# Patient Record
Sex: FEMALE | Race: ASIAN | NOT HISPANIC OR LATINO
[De-identification: names, ages, dates, MRNs, and addresses within clinical notes are randomized per-mention and may not be internally consistent; named-entity substitution may affect disease eponyms.]

---

## 2018-06-14 ENCOUNTER — APPOINTMENT (OUTPATIENT)
Dept: INTERNAL MEDICINE | Facility: CLINIC | Age: 33
End: 2018-06-14
Payer: COMMERCIAL

## 2018-06-14 VITALS — DIASTOLIC BLOOD PRESSURE: 56 MMHG | SYSTOLIC BLOOD PRESSURE: 96 MMHG

## 2018-06-14 VITALS — HEIGHT: 64 IN | WEIGHT: 173 LBS | BODY MASS INDEX: 29.53 KG/M2

## 2018-06-14 VITALS — HEART RATE: 74 BPM | DIASTOLIC BLOOD PRESSURE: 56 MMHG | RESPIRATION RATE: 12 BRPM | SYSTOLIC BLOOD PRESSURE: 96 MMHG

## 2018-06-14 DIAGNOSIS — Z87.891 PERSONAL HISTORY OF NICOTINE DEPENDENCE: ICD-10-CM

## 2018-06-14 DIAGNOSIS — R53.83 OTHER MALAISE: ICD-10-CM

## 2018-06-14 DIAGNOSIS — R53.81 OTHER MALAISE: ICD-10-CM

## 2018-06-14 DIAGNOSIS — F17.290 NICOTINE DEPENDENCE, OTHER TOBACCO PRODUCT, UNCOMPLICATED: ICD-10-CM

## 2018-06-14 DIAGNOSIS — Z00.00 ENCOUNTER FOR GENERAL ADULT MEDICAL EXAMINATION W/OUT ABNORMAL FINDINGS: ICD-10-CM

## 2018-06-14 DIAGNOSIS — Z83.42 FAMILY HISTORY OF FAMILIAL HYPERCHOLESTEROLEMIA: ICD-10-CM

## 2018-06-14 DIAGNOSIS — Z30.9 ENCOUNTER FOR CONTRACEPTIVE MANAGEMENT, UNSPECIFIED: ICD-10-CM

## 2018-06-14 DIAGNOSIS — Z82.49 FAMILY HISTORY OF ISCHEMIC HEART DISEASE AND OTHER DISEASES OF THE CIRCULATORY SYSTEM: ICD-10-CM

## 2018-06-14 DIAGNOSIS — Z78.9 OTHER SPECIFIED HEALTH STATUS: ICD-10-CM

## 2018-06-14 PROCEDURE — 99385 PREV VISIT NEW AGE 18-39: CPT | Mod: 25

## 2018-06-14 PROCEDURE — 36415 COLL VENOUS BLD VENIPUNCTURE: CPT

## 2018-06-14 RX ORDER — PRENATAL 56/IRON/FOLIC AC/DHA 35-5-1 MG
35-5-1-200 CAPSULE ORAL
Qty: 30 | Refills: 0 | Status: ACTIVE | COMMUNITY
Start: 2018-06-05

## 2018-06-14 RX ORDER — NORGESTIMATE AND ETHINYL ESTRADIOL 0.25-0.035
0.25-35 KIT ORAL
Qty: 28 | Refills: 0 | Status: COMPLETED | COMMUNITY
Start: 2018-04-07

## 2018-06-14 NOTE — END OF VISIT
[Time Spent: ___ minutes] : I have spent [unfilled] minutes of face to face time with the patient [>50% of Time Spent on Counseling for ____] : Greater than 50% of the encounter time was spent on counseling for [unfilled]

## 2018-06-14 NOTE — PAST MEDICAL HISTORY
[Definite ___ (Date)] : the last menstrual period was [unfilled] [Normal Amount/Duration] : it was of a normal amount and duration [Regular Cycle Intervals] : have been regular

## 2018-06-15 LAB
25(OH)D3 SERPL-MCNC: 26.4 NG/ML
ALBUMIN SERPL ELPH-MCNC: 4.4 G/DL
ALP BLD-CCNC: 61 U/L
ALT SERPL-CCNC: 25 U/L
ANION GAP SERPL CALC-SCNC: 10 MMOL/L
AST SERPL-CCNC: 18 U/L
BASOPHILS # BLD AUTO: 0.03 K/UL
BASOPHILS NFR BLD AUTO: 0.3 %
BILIRUB SERPL-MCNC: <0.2 MG/DL
BUN SERPL-MCNC: 14 MG/DL
CALCIUM SERPL-MCNC: 9.7 MG/DL
CHLORIDE SERPL-SCNC: 101 MMOL/L
CHOLEST SERPL-MCNC: 233 MG/DL
CHOLEST/HDLC SERPL: 3.9 RATIO
CO2 SERPL-SCNC: 30 MMOL/L
CREAT SERPL-MCNC: 0.92 MG/DL
EOSINOPHIL # BLD AUTO: 0.18 K/UL
EOSINOPHIL NFR BLD AUTO: 1.6 %
GLUCOSE SERPL-MCNC: 82 MG/DL
HBA1C MFR BLD HPLC: 5.3 %
HCT VFR BLD CALC: 45.5 %
HDLC SERPL-MCNC: 60 MG/DL
HGB BLD-MCNC: 14.2 G/DL
IMM GRANULOCYTES NFR BLD AUTO: 0.3 %
LDLC SERPL CALC-MCNC: 148 MG/DL
LYMPHOCYTES # BLD AUTO: 3.72 K/UL
LYMPHOCYTES NFR BLD AUTO: 33.8 %
MAN DIFF?: NORMAL
MCHC RBC-ENTMCNC: 29.2 PG
MCHC RBC-ENTMCNC: 31.2 GM/DL
MCV RBC AUTO: 93.4 FL
MONOCYTES # BLD AUTO: 0.78 K/UL
MONOCYTES NFR BLD AUTO: 7.1 %
NEUTROPHILS # BLD AUTO: 6.26 K/UL
NEUTROPHILS NFR BLD AUTO: 56.9 %
PLATELET # BLD AUTO: 351 K/UL
POTASSIUM SERPL-SCNC: 4.3 MMOL/L
PROT SERPL-MCNC: 7.4 G/DL
RBC # BLD: 4.87 M/UL
RBC # FLD: 13.4 %
SODIUM SERPL-SCNC: 141 MMOL/L
TRIGL SERPL-MCNC: 127 MG/DL
TSH SERPL-ACNC: 1.91 UIU/ML
WBC # FLD AUTO: 11 K/UL

## 2019-01-14 ENCOUNTER — APPOINTMENT (OUTPATIENT)
Dept: ANTEPARTUM | Facility: CLINIC | Age: 34
End: 2019-01-14
Payer: COMMERCIAL

## 2019-01-14 ENCOUNTER — ASOB RESULT (OUTPATIENT)
Age: 34
End: 2019-01-14

## 2019-01-14 PROCEDURE — 76811 OB US DETAILED SNGL FETUS: CPT

## 2019-01-22 ENCOUNTER — APPOINTMENT (OUTPATIENT)
Dept: ANTEPARTUM | Facility: CLINIC | Age: 34
End: 2019-01-22
Payer: COMMERCIAL

## 2019-01-22 ENCOUNTER — ASOB RESULT (OUTPATIENT)
Age: 34
End: 2019-01-22

## 2019-01-22 PROCEDURE — 76816 OB US FOLLOW-UP PER FETUS: CPT

## 2019-01-28 ENCOUNTER — TRANSCRIPTION ENCOUNTER (OUTPATIENT)
Age: 34
End: 2019-01-28

## 2019-04-18 ENCOUNTER — TRANSCRIPTION ENCOUNTER (OUTPATIENT)
Age: 34
End: 2019-04-18

## 2019-05-30 ENCOUNTER — TRANSCRIPTION ENCOUNTER (OUTPATIENT)
Age: 34
End: 2019-05-30

## 2019-05-30 ENCOUNTER — INPATIENT (INPATIENT)
Facility: HOSPITAL | Age: 34
LOS: 5 days | Discharge: HOME | End: 2019-06-05
Attending: OBSTETRICS & GYNECOLOGY | Admitting: OBSTETRICS & GYNECOLOGY
Payer: COMMERCIAL

## 2019-05-30 DIAGNOSIS — O26.899 OTHER SPECIFIED PREGNANCY RELATED CONDITIONS, UNSPECIFIED TRIMESTER: ICD-10-CM

## 2019-05-30 DIAGNOSIS — Z34.80 ENCOUNTER FOR SUPERVISION OF OTHER NORMAL PREGNANCY, UNSPECIFIED TRIMESTER: ICD-10-CM

## 2019-05-30 DIAGNOSIS — Z3A.00 WEEKS OF GESTATION OF PREGNANCY NOT SPECIFIED: ICD-10-CM

## 2019-05-30 RX ORDER — SODIUM CHLORIDE 9 MG/ML
1000 INJECTION, SOLUTION INTRAVENOUS
Refills: 0 | Status: DISCONTINUED | OUTPATIENT
Start: 2019-05-30 | End: 2019-05-31

## 2019-05-30 RX ORDER — AMPICILLIN TRIHYDRATE 250 MG
2 CAPSULE ORAL ONCE
Refills: 0 | Status: COMPLETED | OUTPATIENT
Start: 2019-05-30 | End: 2019-05-30

## 2019-05-30 RX ORDER — AMPICILLIN TRIHYDRATE 250 MG
1 CAPSULE ORAL EVERY 4 HOURS
Refills: 0 | Status: DISCONTINUED | OUTPATIENT
Start: 2019-05-30 | End: 2019-05-31

## 2019-05-30 RX ORDER — OXYTOCIN 10 UNIT/ML
333.33 VIAL (ML) INJECTION
Qty: 20 | Refills: 0 | Status: DISCONTINUED | OUTPATIENT
Start: 2019-05-30 | End: 2019-06-05

## 2019-05-30 RX ORDER — CITRIC ACID/SODIUM CITRATE 300-500 MG
15 SOLUTION, ORAL ORAL EVERY 6 HOURS
Refills: 0 | Status: DISCONTINUED | OUTPATIENT
Start: 2019-05-30 | End: 2019-05-31

## 2019-05-31 VITALS
DIASTOLIC BLOOD PRESSURE: 66 MMHG | RESPIRATION RATE: 23 BRPM | HEIGHT: 64 IN | HEART RATE: 86 BPM | SYSTOLIC BLOOD PRESSURE: 131 MMHG | WEIGHT: 210.1 LBS

## 2019-05-31 LAB
BASOPHILS # BLD AUTO: 0.1 K/UL — SIGNIFICANT CHANGE UP (ref 0–0.2)
BASOPHILS NFR BLD AUTO: 0.5 % — SIGNIFICANT CHANGE UP (ref 0–2)
BLD GP AB SCN SERPL QL: NEGATIVE — SIGNIFICANT CHANGE UP
EOSINOPHIL # BLD AUTO: 0.1 K/UL — SIGNIFICANT CHANGE UP (ref 0–0.5)
EOSINOPHIL NFR BLD AUTO: 0.6 % — SIGNIFICANT CHANGE UP (ref 0–6)
HCT VFR BLD CALC: 40.3 % — SIGNIFICANT CHANGE UP (ref 34.5–45)
HGB BLD-MCNC: 13.6 G/DL — SIGNIFICANT CHANGE UP (ref 11.5–15.5)
LYMPHOCYTES # BLD AUTO: 19.8 % — SIGNIFICANT CHANGE UP (ref 13–44)
LYMPHOCYTES # BLD AUTO: 2.5 K/UL — SIGNIFICANT CHANGE UP (ref 1–3.3)
MCHC RBC-ENTMCNC: 31 PG — SIGNIFICANT CHANGE UP (ref 27–34)
MCHC RBC-ENTMCNC: 33.7 GM/DL — SIGNIFICANT CHANGE UP (ref 32–36)
MCV RBC AUTO: 92 FL — SIGNIFICANT CHANGE UP (ref 80–100)
MONOCYTES # BLD AUTO: 1 K/UL — HIGH (ref 0–0.9)
MONOCYTES NFR BLD AUTO: 7.5 % — SIGNIFICANT CHANGE UP (ref 2–14)
NEUTROPHILS # BLD AUTO: 9.1 K/UL — HIGH (ref 1.8–7.4)
NEUTROPHILS NFR BLD AUTO: 71.5 % — SIGNIFICANT CHANGE UP (ref 43–77)
PLATELET # BLD AUTO: 275 K/UL — SIGNIFICANT CHANGE UP (ref 150–400)
RBC # BLD: 4.38 M/UL — SIGNIFICANT CHANGE UP (ref 3.8–5.2)
RBC # FLD: 13 % — SIGNIFICANT CHANGE UP (ref 10.3–14.5)
RH IG SCN BLD-IMP: NEGATIVE — SIGNIFICANT CHANGE UP
RH IG SCN BLD-IMP: NEGATIVE — SIGNIFICANT CHANGE UP
T PALLIDUM AB TITR SER: NEGATIVE — SIGNIFICANT CHANGE UP
WBC # BLD: 12.8 K/UL — HIGH (ref 3.8–10.5)
WBC # FLD AUTO: 12.8 K/UL — HIGH (ref 3.8–10.5)

## 2019-05-31 RX ORDER — OXYCODONE HYDROCHLORIDE 5 MG/1
5 TABLET ORAL
Refills: 0 | Status: DISCONTINUED | OUTPATIENT
Start: 2019-05-31 | End: 2019-06-05

## 2019-05-31 RX ORDER — SIMETHICONE 80 MG/1
80 TABLET, CHEWABLE ORAL EVERY 4 HOURS
Refills: 0 | Status: DISCONTINUED | OUTPATIENT
Start: 2019-05-31 | End: 2019-06-05

## 2019-05-31 RX ORDER — DOCUSATE SODIUM 100 MG
100 CAPSULE ORAL
Refills: 0 | Status: DISCONTINUED | OUTPATIENT
Start: 2019-05-31 | End: 2019-06-05

## 2019-05-31 RX ORDER — GLYCERIN ADULT
1 SUPPOSITORY, RECTAL RECTAL AT BEDTIME
Refills: 0 | Status: DISCONTINUED | OUTPATIENT
Start: 2019-05-31 | End: 2019-06-05

## 2019-05-31 RX ORDER — PRAMOXINE HYDROCHLORIDE 150 MG/15G
1 AEROSOL, FOAM RECTAL EVERY 4 HOURS
Refills: 0 | Status: DISCONTINUED | OUTPATIENT
Start: 2019-05-31 | End: 2019-06-05

## 2019-05-31 RX ORDER — LANOLIN
1 OINTMENT (GRAM) TOPICAL EVERY 6 HOURS
Refills: 0 | Status: DISCONTINUED | OUTPATIENT
Start: 2019-05-31 | End: 2019-06-05

## 2019-05-31 RX ORDER — DIBUCAINE 1 %
1 OINTMENT (GRAM) RECTAL EVERY 6 HOURS
Refills: 0 | Status: DISCONTINUED | OUTPATIENT
Start: 2019-05-31 | End: 2019-06-05

## 2019-05-31 RX ORDER — TETANUS TOXOID, REDUCED DIPHTHERIA TOXOID AND ACELLULAR PERTUSSIS VACCINE, ADSORBED 5; 2.5; 8; 8; 2.5 [IU]/.5ML; [IU]/.5ML; UG/.5ML; UG/.5ML; UG/.5ML
0.5 SUSPENSION INTRAMUSCULAR ONCE
Refills: 0 | Status: DISCONTINUED | OUTPATIENT
Start: 2019-05-31 | End: 2019-06-05

## 2019-05-31 RX ORDER — MAGNESIUM HYDROXIDE 400 MG/1
30 TABLET, CHEWABLE ORAL
Refills: 0 | Status: DISCONTINUED | OUTPATIENT
Start: 2019-05-31 | End: 2019-06-05

## 2019-05-31 RX ORDER — OXYCODONE HYDROCHLORIDE 5 MG/1
5 TABLET ORAL ONCE
Refills: 0 | Status: DISCONTINUED | OUTPATIENT
Start: 2019-05-31 | End: 2019-06-05

## 2019-05-31 RX ORDER — IBUPROFEN 200 MG
600 TABLET ORAL EVERY 6 HOURS
Refills: 0 | Status: COMPLETED | OUTPATIENT
Start: 2019-05-31 | End: 2020-04-28

## 2019-05-31 RX ORDER — GENTAMICIN SULFATE 40 MG/ML
475 VIAL (ML) INJECTION ONCE
Refills: 0 | Status: COMPLETED | OUTPATIENT
Start: 2019-05-31 | End: 2019-05-31

## 2019-05-31 RX ORDER — KETOROLAC TROMETHAMINE 30 MG/ML
30 SYRINGE (ML) INJECTION ONCE
Refills: 0 | Status: DISCONTINUED | OUTPATIENT
Start: 2019-05-31 | End: 2019-05-31

## 2019-05-31 RX ORDER — AER TRAVELER 0.5 G/1
1 SOLUTION RECTAL; TOPICAL EVERY 4 HOURS
Refills: 0 | Status: DISCONTINUED | OUTPATIENT
Start: 2019-05-31 | End: 2019-06-05

## 2019-05-31 RX ORDER — IBUPROFEN 200 MG
600 TABLET ORAL EVERY 6 HOURS
Refills: 0 | Status: DISCONTINUED | OUTPATIENT
Start: 2019-05-31 | End: 2019-06-05

## 2019-05-31 RX ORDER — ACETAMINOPHEN 500 MG
975 TABLET ORAL EVERY 6 HOURS
Refills: 0 | Status: DISCONTINUED | OUTPATIENT
Start: 2019-05-31 | End: 2019-06-05

## 2019-05-31 RX ORDER — OXYTOCIN 10 UNIT/ML
4 VIAL (ML) INJECTION
Qty: 30 | Refills: 0 | Status: DISCONTINUED | OUTPATIENT
Start: 2019-05-31 | End: 2019-06-05

## 2019-05-31 RX ORDER — BENZOCAINE 10 %
1 GEL (GRAM) MUCOUS MEMBRANE EVERY 6 HOURS
Refills: 0 | Status: DISCONTINUED | OUTPATIENT
Start: 2019-05-31 | End: 2019-06-05

## 2019-05-31 RX ORDER — OXYTOCIN 10 UNIT/ML
333.33 VIAL (ML) INJECTION
Qty: 20 | Refills: 0 | Status: DISCONTINUED | OUTPATIENT
Start: 2019-05-31 | End: 2019-06-05

## 2019-05-31 RX ORDER — SODIUM CHLORIDE 9 MG/ML
3 INJECTION INTRAMUSCULAR; INTRAVENOUS; SUBCUTANEOUS EVERY 8 HOURS
Refills: 0 | Status: DISCONTINUED | OUTPATIENT
Start: 2019-05-31 | End: 2019-06-05

## 2019-05-31 RX ORDER — HYDROCORTISONE 1 %
1 OINTMENT (GRAM) TOPICAL EVERY 6 HOURS
Refills: 0 | Status: DISCONTINUED | OUTPATIENT
Start: 2019-05-31 | End: 2019-06-05

## 2019-05-31 RX ORDER — DIPHENHYDRAMINE HCL 50 MG
25 CAPSULE ORAL EVERY 6 HOURS
Refills: 0 | Status: DISCONTINUED | OUTPATIENT
Start: 2019-05-31 | End: 2019-06-05

## 2019-05-31 RX ADMIN — Medication 300 MILLIGRAM(S): at 12:45

## 2019-05-31 RX ADMIN — Medication 216 GRAM(S): at 00:47

## 2019-05-31 RX ADMIN — Medication 975 MILLIGRAM(S): at 22:56

## 2019-05-31 RX ADMIN — Medication 108 GRAM(S): at 04:48

## 2019-05-31 RX ADMIN — Medication 30 MILLIGRAM(S): at 14:56

## 2019-05-31 RX ADMIN — Medication 975 MILLIGRAM(S): at 11:30

## 2019-05-31 RX ADMIN — Medication 108 GRAM(S): at 09:15

## 2019-05-31 RX ADMIN — SODIUM CHLORIDE 125 MILLILITER(S): 9 INJECTION, SOLUTION INTRAVENOUS at 00:47

## 2019-05-31 RX ADMIN — Medication 4 MILLIUNIT(S)/MIN: at 04:48

## 2019-06-01 LAB
HCT VFR BLD CALC: 29.9 % — LOW (ref 34.5–45)
HCT VFR BLD CALC: 29.9 % — LOW (ref 34.5–45)
HGB BLD-MCNC: 9.6 G/DL — LOW (ref 11.5–15.5)
HGB BLD-MCNC: 9.6 G/DL — LOW (ref 11.5–15.5)
KLEIHAUER-BETKE CALCULATION: 0 % — SIGNIFICANT CHANGE UP (ref 0–0.3)
MCHC RBC-ENTMCNC: 30.4 PG — SIGNIFICANT CHANGE UP (ref 27–34)
MCHC RBC-ENTMCNC: 32.1 GM/DL — SIGNIFICANT CHANGE UP (ref 32–36)
MCV RBC AUTO: 94.6 FL — SIGNIFICANT CHANGE UP (ref 80–100)
PLATELET # BLD AUTO: 218 K/UL — SIGNIFICANT CHANGE UP (ref 150–400)
RBC # BLD: 3.16 M/UL — LOW (ref 3.8–5.2)
RBC # FLD: 14.3 % — SIGNIFICANT CHANGE UP (ref 10.3–14.5)
WBC # BLD: 16.64 K/UL — HIGH (ref 3.8–10.5)
WBC # FLD AUTO: 16.64 K/UL — HIGH (ref 3.8–10.5)

## 2019-06-01 RX ADMIN — Medication 600 MILLIGRAM(S): at 08:02

## 2019-06-01 RX ADMIN — Medication 600 MILLIGRAM(S): at 15:30

## 2019-06-01 RX ADMIN — Medication 600 MILLIGRAM(S): at 02:50

## 2019-06-01 RX ADMIN — Medication 600 MILLIGRAM(S): at 15:38

## 2019-06-01 RX ADMIN — Medication 975 MILLIGRAM(S): at 17:54

## 2019-06-01 RX ADMIN — MAGNESIUM HYDROXIDE 30 MILLILITER(S): 400 TABLET, CHEWABLE ORAL at 22:44

## 2019-06-01 RX ADMIN — Medication 975 MILLIGRAM(S): at 08:25

## 2019-06-01 RX ADMIN — Medication 975 MILLIGRAM(S): at 08:01

## 2019-06-01 NOTE — PROGRESS NOTE ADULT - SUBJECTIVE AND OBJECTIVE BOX
OB Progress Note: VAVD PPD#1    S: 32yo  PPD#1 s/p VAVD c/b intrapartum temperature. Patient feels well. Pain is well controlled. She is tolerating a regular diet and passing flatus. She is voiding spontaneously, and ambulating without difficulty. Denies CP/SOB. Denies lightheadedness/dizziness. Denies N/V.    O:  Vitals:  Vital Signs Last 24 Hrs  T(C): 37.2 (31 May 2019 14:05), Max: 37.2 (31 May 2019 14:05)  T(F): 99 (31 May 2019 14:05), Max: 99 (31 May 2019 14:05)  HR: 88 (01 Jun 2019 06:30) (82 - 116)  BP: 128/63 (01 Jun 2019 06:30) (100/47 - 128/63)  BP(mean): 81 (01 Jun 2019 01:45) (73 - 88)  RR: 18 (31 May 2019 18:45) (17 - 18)  SpO2: 98% (01 Jun 2019 06:30) (95% - 98%)    MEDICATIONS  (STANDING):  acetaminophen   Tablet .. 975 milliGRAM(s) Oral every 6 hours  diphtheria/tetanus/pertussis (acellular) Vaccine (ADAcel) 0.5 milliLiter(s) IntraMuscular once  ibuprofen  Tablet. 600 milliGRAM(s) Oral every 6 hours  oxytocin Infusion 333.333 milliUNIT(s)/Min (1000 mL/Hr) IV Continuous <Continuous>  oxytocin Infusion 333.333 milliUNIT(s)/Min (1000 mL/Hr) IV Continuous <Continuous>  oxytocin Infusion 4 milliUNIT(s)/Min (4 mL/Hr) IV Continuous <Continuous>  prenatal multivitamin 1 Tablet(s) Oral daily  sodium chloride 0.9% lock flush 3 milliLiter(s) IV Push every 8 hours      Physical Exam:  General: NAD  Abdomen: soft, non-tender, non-distended, fundus firm  Vaginal: Lochia wnl  Extremities: NTTP, no erythema, no edema    Labs:  Blood type: A Negative  Rubella IgG: RPR: Negative                          13.6   12.8<H> >-----------< 275    ( 05-31 @ 00:14 )             40.3 OB Progress Note: VAVD PPD#1    S: 34yo  PPD#1 s/p VAVD c/b intrapartum temperature. Patient feels well. Pain is well controlled. However difficulty walking overnight. Able to stand however cannot weight bear on a single leg, and therefore cannnot ambulate. She is tolerating a regular diet and passing flatus. She is voiding spontaneously. Denies CP/SOB. Denies lightheadedness/dizziness. Denies N/V.    O:  Vitals:  Vital Signs Last 24 Hrs  T(C): 37.2 (31 May 2019 14:05), Max: 37.2 (31 May 2019 14:05)  T(F): 99 (31 May 2019 14:05), Max: 99 (31 May 2019 14:05)  HR: 88 (01 Jun 2019 06:30) (82 - 116)  BP: 128/63 (01 Jun 2019 06:30) (100/47 - 128/63)  BP(mean): 81 (01 Jun 2019 01:45) (73 - 88)  RR: 18 (31 May 2019 18:45) (17 - 18)  SpO2: 98% (01 Jun 2019 06:30) (95% - 98%)    MEDICATIONS  (STANDING):  acetaminophen   Tablet .. 975 milliGRAM(s) Oral every 6 hours  diphtheria/tetanus/pertussis (acellular) Vaccine (ADAcel) 0.5 milliLiter(s) IntraMuscular once  ibuprofen  Tablet. 600 milliGRAM(s) Oral every 6 hours  oxytocin Infusion 333.333 milliUNIT(s)/Min (1000 mL/Hr) IV Continuous <Continuous>  oxytocin Infusion 333.333 milliUNIT(s)/Min (1000 mL/Hr) IV Continuous <Continuous>  oxytocin Infusion 4 milliUNIT(s)/Min (4 mL/Hr) IV Continuous <Continuous>  prenatal multivitamin 1 Tablet(s) Oral daily  sodium chloride 0.9% lock flush 3 milliLiter(s) IV Push every 8 hours      Physical Exam:  General: NAD  Abdomen: soft, non-tender, non-distended, fundus firm  Vaginal: Lochia wnl  Lower Extremities: NTTP, no erythema, no edema. Strength 5/5 bilaterally. ROM intact. Decreased gross sensation over knee only.     Labs:  Blood type: A Negative  Rubella IgG: RPR: Negative                          13.6   12.8<H> >-----------< 275    ( 05-31 @ 00:14 )             40.3

## 2019-06-01 NOTE — PROGRESS NOTE ADULT - PROBLEM SELECTOR PLAN 1
-Patient afebrile overnight. VSS and wnl. Is s/p abx. Will continue to monitor.  - Pain well controlled, continue current pain regimen  - Increase ambulation  - Continue regular diet    Narcisa Ziegler PGY-1 -Patient afebrile overnight. VSS and wnl. Is s/p abx. Will continue to monitor.  - LE strength intact bilaterally and given that paresthesia does not follow a distinct nerve distribution, possibly residual local anesthesia versus nerve impingement/involvement. Per anesthesia agree with aforementioned. Recommend neuro consult.  - Pain well controlled, continue current pain regimen  - Continue regular diet    Narcisa Ziegler PGY-1

## 2019-06-01 NOTE — CONSULT NOTE ADULT - ASSESSMENT
33y Female PD#1 s/p VAVD, neurology consulted for BLE knee numbness. PE with no signs of CNS involvement; numbness likely 2/2 residual anesthesia; less likely but possibly 2/2 trauma from epidural although findings not fully consistent with dermatomal distribution.     Recs:  No acute neurologic intervention at this time.

## 2019-06-01 NOTE — PROVIDER CONTACT NOTE (OTHER) - SITUATION
patient is s/p  at 1323 on ; patient unable to lift left leg, anesthesia assessed on  at 2200 as per patient; patient states numbness, denies pain, vital signs WNL

## 2019-06-01 NOTE — CONSULT NOTE ADULT - SUBJECTIVE AND OBJECTIVE BOX
Neurology Consult    33y Female PD#1 s/p VAVD, neurology consulted for BLE knee numbness. As per patient, two hours post procedure patient had unsteady gait. Patient also reporting BLE knee numbness which started from onset of epidural, has progressively improved with residual numbness in knees, L > R    REVIEW OF SYSTEMS:  As abvoe    MEDICATIONS  acetaminophen   Tablet .. 975 milliGRAM(s) Oral every 6 hours  benzocaine 20%/menthol 0.5% Spray 1 Spray(s) Topical every 6 hours PRN  dibucaine 1% Ointment 1 Application(s) Topical every 6 hours PRN  diphenhydrAMINE 25 milliGRAM(s) Oral every 6 hours PRN  diphtheria/tetanus/pertussis (acellular) Vaccine (ADAcel) 0.5 milliLiter(s) IntraMuscular once  docusate sodium 100 milliGRAM(s) Oral two times a day PRN  glycerin Suppository - Adult 1 Suppository(s) Rectal at bedtime PRN  hydrocortisone 1% Cream 1 Application(s) Topical every 6 hours PRN  ibuprofen  Tablet. 600 milliGRAM(s) Oral every 6 hours  lanolin Ointment 1 Application(s) Topical every 6 hours PRN  magnesium hydroxide Suspension 30 milliLiter(s) Oral two times a day PRN  oxyCODONE    IR 5 milliGRAM(s) Oral every 3 hours PRN  oxyCODONE    IR 5 milliGRAM(s) Oral once PRN  oxytocin Infusion 333.333 milliUNIT(s)/Min IV Continuous <Continuous>  oxytocin Infusion 333.333 milliUNIT(s)/Min IV Continuous <Continuous>  oxytocin Infusion 4 milliUNIT(s)/Min IV Continuous <Continuous>  pramoxine 1%/zinc 5% Cream 1 Application(s) Topical every 4 hours PRN  prenatal multivitamin 1 Tablet(s) Oral daily  simethicone 80 milliGRAM(s) Chew every 4 hours PRN  sodium chloride 0.9% lock flush 3 milliLiter(s) IV Push every 8 hours  witch hazel Pads 1 Application(s) Topical every 4 hours PRN      PMH:      PSH:     FAMILY HISTORY:    No history of dementia, strokes, or seizures     SOCIAL HISTORY:  No history of tobacco or alcohol use     Allergies    No Known Allergies    Intolerances        Vital Signs Last 24 Hrs  T(C): 36.5 (01 Jun 2019 07:40), Max: 37.2 (31 May 2019 14:05)  T(F): 97.7 (01 Jun 2019 07:40), Max: 99 (31 May 2019 14:05)  HR: 82 (01 Jun 2019 07:40) (82 - 116)  BP: 137/60 (01 Jun 2019 07:40) (100/47 - 137/60)  BP(mean): 87 (01 Jun 2019 07:40) (73 - 88)  RR: 17 (01 Jun 2019 07:40) (17 - 18)  SpO2: 96% (01 Jun 2019 07:40) (95% - 98%)    Neurological Examination:    Mental Status: Patient is alert, awake, oriented X3. Patient is fluent, no dysarthria, no aphasia. Follows commands well and able to answer questions appropriately. Mood and affect normal.    Cranial Nerves:  EOMI, visual field intact, V1-V3 intact, no gross facial asymmetry, tongue/uvula midline    Motor Exam: No drift  Right upper extremity: 5/5  Left upper extremity: 5/5  Right lower extremity: 3/5 hip flex(pain limited), 5/5 otherwise  Left lower extremity: 2/5 hip flex(pain limited), 5/5 otherwise    Normal bulk/tone    Sensory: decreased to LT, pinprick; intact to proprioception, senses vibration > 10 secs in duration BLE knee, L anterior shin and dorsum of foot. Otherwise intact.     Coordination: Finger to nose intact bilaterally     Gait: Deferred    Reflexes: Bilateral 2+ Biceps, Brachial, +1 Patellar, +2 Ankle  Plantar: Down bilateral    GENERAL: No acute distress  HEENT:  Normocephalic, atraumatic  EXTREMITIES: No edema, clubbing, cyanosis  MUSCULOSKELETAL: Normal range of motion  SKIN: No rashes    LABS:  CBC Full  -  ( 31 May 2019 00:14 )  WBC Count : 12.8 K/uL  RBC Count : 4.38 M/uL  Hemoglobin : 13.6 g/dL  Hematocrit : 40.3 %  Platelet Count - Automated : 275 K/uL  Mean Cell Volume : 92.0 fl  Mean Cell Hemoglobin : 31.0 pg  Mean Cell Hemoglobin Concentration : 33.7 gm/dL  Auto Neutrophil # : 9.1 K/uL  Auto Lymphocyte # : 2.5 K/uL  Auto Monocyte # : 1.0 K/uL  Auto Eosinophil # : 0.1 K/uL  Auto Basophil # : 0.1 K/uL  Auto Neutrophil % : 71.5 %  Auto Lymphocyte % : 19.8 %  Auto Monocyte % : 7.5 %  Auto Eosinophil % : 0.6 %  Auto Basophil % : 0.5 %              Hemoglobin A1C:           RADIOLOGY:  CT head:  MRI:

## 2019-06-01 NOTE — CONSULT NOTE ADULT - ATTENDING COMMENTS
Patient PD d 2 s/p epidural for VAVD who has experienced weakness and numbness in her legs with buckling of her legs when attempting to walk. Her L/D lasted ~3hrs. Denies any UE weakness of incontinence. Has noticed reduction in extent of numbness but not changes to strength. Has minimal lower back pain    Exam: UE: 5/5 bilateral  Left LE: 5/5 HF/abd/add/HE 3-4+ KE  5/5 KF/DF/PF            Right LE: 4+ HF 5/5  hip abd/add/HE  5/5 KF/KE/DF/PF  DTRS : 2+ UE absent KJs 2+ AJs  Unable to stand due to weakness with tendency of her knees L>R to buckle    A/P Suspect b/l femoral neuropathy.   -MRI L/S wo contrast   - PT  - will need EMG in 3-4 weeks likely

## 2019-06-02 ENCOUNTER — TRANSCRIPTION ENCOUNTER (OUTPATIENT)
Age: 34
End: 2019-06-02

## 2019-06-02 DIAGNOSIS — R26.9 UNSPECIFIED ABNORMALITIES OF GAIT AND MOBILITY: ICD-10-CM

## 2019-06-02 PROCEDURE — 72148 MRI LUMBAR SPINE W/O DYE: CPT | Mod: 26

## 2019-06-02 PROCEDURE — 99223 1ST HOSP IP/OBS HIGH 75: CPT

## 2019-06-02 RX ORDER — ACETAMINOPHEN 500 MG
2 TABLET ORAL
Qty: 0 | Refills: 0 | DISCHARGE
Start: 2019-06-02

## 2019-06-02 RX ORDER — IBUPROFEN 200 MG
1 TABLET ORAL
Qty: 0 | Refills: 0 | DISCHARGE
Start: 2019-06-02

## 2019-06-02 RX ADMIN — Medication 600 MILLIGRAM(S): at 14:30

## 2019-06-02 RX ADMIN — Medication 100 MILLIGRAM(S): at 14:03

## 2019-06-02 RX ADMIN — Medication 975 MILLIGRAM(S): at 17:25

## 2019-06-02 RX ADMIN — Medication 975 MILLIGRAM(S): at 16:59

## 2019-06-02 RX ADMIN — Medication 1 TABLET(S): at 12:31

## 2019-06-02 RX ADMIN — Medication 975 MILLIGRAM(S): at 11:00

## 2019-06-02 RX ADMIN — Medication 600 MILLIGRAM(S): at 03:00

## 2019-06-02 RX ADMIN — Medication 975 MILLIGRAM(S): at 10:37

## 2019-06-02 RX ADMIN — Medication 600 MILLIGRAM(S): at 02:15

## 2019-06-02 RX ADMIN — Medication 600 MILLIGRAM(S): at 14:01

## 2019-06-02 NOTE — PROGRESS NOTE ADULT - PROBLEM SELECTOR PLAN 1
-S/p anesthesia and neurology consults. Likely residual anesthesia. However given persistent paresthesia >24h following delivery suspect possible nerve involvement secondary to labor/delivery. Consider PT consult this am. Will discuss with attending.  - Pain well controlled, continue current pain regimen  - Increase ambulation  - Continue regular diet  - Discharge planning     Narcisa Ziegler PGY-1 - Pain well controlled, continue current pain regimen  - Increase ambulation  - Continue regular diet      Narcisa Ziegler PGY-1

## 2019-06-02 NOTE — CHART NOTE - NSCHARTNOTEFT_GEN_A_CORE
Pt seen with attending at AM rounds.   Exam indicative of femoral neuropathy which is fairly common complication of being in prolonged lithotomy position caused vy extreme abduction of thighs with external rotation at the hip causing ischemia of the femoral nerve as it is kinked beneath the tough inguinal ligament     - MRI L-spine w/o given bilateral weakness to r/o nerve compression   - PT/OT aggressive mobilization with help   - plz f/u attendind attestation to original consult note   - if imaging w/o actionable findings, pt should f/u with outpatient neurology for possibly EMG    - Please have pt follow up outpatient with general neurology within 1-2 week of DC at [facility] located at  [address] [place] [address], [address] [place]; Ph# [phone]     d/w pt, spouse and OB team

## 2019-06-02 NOTE — DISCHARGE NOTE OB - PLAN OF CARE
Routine postpartum care Please call office for 6 week postpartum visit. recovery Home physical therapy to start this Friday. Patient set up with rolling walker. Please follow up outpatient with general neurology within 1-2 week of DC at [facility] located at  [address] [place] [address], [facility] [address] [place]; Ph# [phone]

## 2019-06-02 NOTE — DISCHARGE NOTE OB - MATERIALS PROVIDED
Breastfeeding Mother’s Support Group Information/Guide to Postpartum Care/[facility] Hearing Screen Program/Shaken Baby Prevention Handout/Breastfeeding Guide and Packet/Birth Certificate Instructions/[facility]  Screening Program/Breastfeeding Log/Back To Sleep Handout

## 2019-06-02 NOTE — DISCHARGE NOTE OB - CARE PROVIDER_API CALL
Ashish Lion)  Obstetrics and Gynecology  [address] [place] [address]  Phone: [phone]  Fax: [phone]  Follow Up Time:

## 2019-06-02 NOTE — DISCHARGE NOTE OB - PATIENT PORTAL LINK FT
You can access the [facility] Patient Portal, offered by [facility], by registering with the following website: [email or web address]

## 2019-06-02 NOTE — DISCHARGE NOTE OB - CARE PLAN
Principal Discharge DX:	Vacuum extractor delivery, delivered  Goal:	Routine postpartum care  Assessment and plan of treatment:	Please call office for 6 week postpartum visit. Principal Discharge DX:	Vacuum extractor delivery, delivered  Goal:	Routine postpartum care  Assessment and plan of treatment:	Please call office for 6 week postpartum visit.  Secondary Diagnosis:	Gait disturbance  Goal:	recovery  Assessment and plan of treatment:	Home physical therapy to start this Friday. Patient set up with rolling walker. Please follow up outpatient with general neurology within 1-2 week of DC at [facility] located at  [address] [place] [address], [address] [place]; Ph# [phone]

## 2019-06-02 NOTE — PROGRESS NOTE ADULT - SUBJECTIVE AND OBJECTIVE BOX
OB Progress Note: VAVD PPD#2    S: 32yo PPD#2 s/p VAVD c/b difficulty ambulating. Patient feels well however reports persistent difficulty with ambulation. States bilateral anterior knee and lower legs feel "numb" so that she is able to stand unassisted for a brief period of time, but upon ambulation starts to buckle. Is able to walk assisted. Denies any pain in lower extremities. Otherwise pain is well controlled. She is tolerating a regular diet and passing flatus. She is voiding spontaneously. Denies CP/SOB. Denies lightheadedness/dizziness. Denies N/V.    O:  Vitals:   Vital Signs Last 24 Hrs  T(C): 36.6 (02 Jun 2019 06:53), Max: 97.8 (01 Jun 2019 12:30)  T(F): 97.9 (02 Jun 2019 06:53), Max: 208 (01 Jun 2019 12:30)  HR: 83 (02 Jun 2019 06:53) (77 - 89)  BP: 121/73 (02 Jun 2019 06:53) (106/70 - 121/73)  BP(mean): --  RR: 18 (02 Jun 2019 06:53) (18 - 18)  SpO2: 99% (01 Jun 2019 17:10) (99% - 99%)    MEDICATIONS  (STANDING):  acetaminophen   Tablet .. 975 milliGRAM(s) Oral every 6 hours  diphtheria/tetanus/pertussis (acellular) Vaccine (ADAcel) 0.5 milliLiter(s) IntraMuscular once  ibuprofen  Tablet. 600 milliGRAM(s) Oral every 6 hours  measles/mumps/rubella Vaccine 0.5 milliLiter(s) SubCutaneous once  oxytocin Infusion 333.333 milliUNIT(s)/Min (1000 mL/Hr) IV Continuous <Continuous>  oxytocin Infusion 333.333 milliUNIT(s)/Min (1000 mL/Hr) IV Continuous <Continuous>  oxytocin Infusion 4 milliUNIT(s)/Min (4 mL/Hr) IV Continuous <Continuous>  prenatal multivitamin 1 Tablet(s) Oral daily  sodium chloride 0.9% lock flush 3 milliLiter(s) IV Push every 8 hours    MEDICATIONS  (PRN):  benzocaine 20%/menthol 0.5% Spray 1 Spray(s) Topical every 6 hours PRN for Perineal discomfort  dibucaine 1% Ointment 1 Application(s) Topical every 6 hours PRN Perineal discomfort  diphenhydrAMINE 25 milliGRAM(s) Oral every 6 hours PRN Pruritus  docusate sodium 100 milliGRAM(s) Oral two times a day PRN For stool softening  glycerin Suppository - Adult 1 Suppository(s) Rectal at bedtime PRN Constipation  hydrocortisone 1% Cream 1 Application(s) Topical every 6 hours PRN Moderate Pain (4-6)  lanolin Ointment 1 Application(s) Topical every 6 hours PRN nipple soreness  magnesium hydroxide Suspension 30 milliLiter(s) Oral two times a day PRN Constipation  oxyCODONE    IR 5 milliGRAM(s) Oral every 3 hours PRN Moderate Pain (4 - 6)  oxyCODONE    IR 5 milliGRAM(s) Oral once PRN Severe Pain (7 -10)  pramoxine 1%/zinc 5% Cream 1 Application(s) Topical every 4 hours PRN Moderate Pain (4-6)  simethicone 80 milliGRAM(s) Chew every 4 hours PRN Gas  witch hazel Pads 1 Application(s) Topical every 4 hours PRN Perineal discomfort      Physical Exam:  General: NAD  Abdomen: soft, non-tender, non-distended, fundus firm  Vaginal: Lochia wnl  Lower Extremities: NTTP, no erythema, no edema, ROM intact bilaterally, strength intact bilaterally, decreased gross sensation bilaterally over anterior knee and shins    Labs:  Blood type: A Negative  Rubella IgG: RPR: Negative                          9.6<L>   16.64<H> >-----------< 218    ( 06-01 @ 10:19 )             29.9<L>                        13.6   12.8<H> >-----------< 275    ( 05-31 @ 00:14 )             40.3

## 2019-06-02 NOTE — PROGRESS NOTE ADULT - PROBLEM SELECTOR PLAN 2
-S/p anesthesia and neurology consults. Likely residual anesthesia. However given persistent paresthesia >24h following delivery suspect possible nerve involvement secondary to labor/delivery.   -Consider PT consult this am.  -Appreciate Neurology consultation

## 2019-06-02 NOTE — DISCHARGE NOTE OB - HOSPITAL COURSE
Patient admitted for induction of labor and had an vacuum assisted delivery with a 3rd degree vaginal laceration.  On postpartum day 1, patient complained of limitation in moving lower extremities patient was seen by anesthesia and neurology. Limitations attributable to affects from cande anesthesia, patient was moving all extremeties well at the time of dicharge.   Patient had an unremarkable postpartum course and was stable for discharge home on postpartum day 2. Patient was offered MMR vaccine for nonimmunity to measles Patient admitted for induction of labor and had an vacuum assisted delivery with a 3rd degree vaginal laceration.    On postpartum day 1, patient complained of limitation in moving lower extremities patient was seen by anesthesia and neurology.   On postpartum day 2, patient still with limitation of lower extremeties.  Physical therapy ordered and MRI of spine ordered.     Patient was offered MMR vaccine for nonimmunity to measles

## 2019-06-02 NOTE — DISCHARGE NOTE OB - MEDICATION SUMMARY - MEDICATIONS TO TAKE
I will START or STAY ON the medications listed below when I get home from the hospital:    acetaminophen 500 mg oral tablet  -- 2 tab(s) by mouth every 6 hours  -- Indication: For pain    ibuprofen 600 mg oral tablet  -- 1 tab(s) by mouth every 6 hours  -- Indication: For pain    Prenatal Multivitamins with Folic Acid 1 mg oral tablet  -- 1 tab(s) by mouth once a day  -- Indication: For routine postpartum care

## 2019-06-03 RX ORDER — HEPARIN SODIUM 5000 [USP'U]/ML
5000 INJECTION INTRAVENOUS; SUBCUTANEOUS EVERY 12 HOURS
Refills: 0 | Status: DISCONTINUED | OUTPATIENT
Start: 2019-06-03 | End: 2019-06-05

## 2019-06-03 RX ORDER — ONDANSETRON 8 MG/1
8 TABLET, FILM COATED ORAL ONCE
Refills: 0 | Status: DISCONTINUED | OUTPATIENT
Start: 2019-06-03 | End: 2019-06-05

## 2019-06-03 RX ADMIN — Medication 975 MILLIGRAM(S): at 23:21

## 2019-06-03 RX ADMIN — Medication 600 MILLIGRAM(S): at 10:33

## 2019-06-03 RX ADMIN — Medication 600 MILLIGRAM(S): at 17:37

## 2019-06-03 RX ADMIN — Medication 975 MILLIGRAM(S): at 10:33

## 2019-06-03 RX ADMIN — Medication 600 MILLIGRAM(S): at 10:03

## 2019-06-03 RX ADMIN — Medication 600 MILLIGRAM(S): at 18:07

## 2019-06-03 RX ADMIN — Medication 975 MILLIGRAM(S): at 18:07

## 2019-06-03 RX ADMIN — HEPARIN SODIUM 5000 UNIT(S): 5000 INJECTION INTRAVENOUS; SUBCUTANEOUS at 23:21

## 2019-06-03 RX ADMIN — Medication 975 MILLIGRAM(S): at 10:03

## 2019-06-03 RX ADMIN — Medication 975 MILLIGRAM(S): at 17:37

## 2019-06-03 RX ADMIN — Medication 100 MILLIGRAM(S): at 00:27

## 2019-06-03 RX ADMIN — Medication 1 TABLET(S): at 12:48

## 2019-06-03 RX ADMIN — Medication 600 MILLIGRAM(S): at 23:21

## 2019-06-03 RX ADMIN — Medication 100 MILLIGRAM(S): at 10:03

## 2019-06-03 RX ADMIN — Medication 975 MILLIGRAM(S): at 01:22

## 2019-06-03 RX ADMIN — HEPARIN SODIUM 5000 UNIT(S): 5000 INJECTION INTRAVENOUS; SUBCUTANEOUS at 12:48

## 2019-06-03 RX ADMIN — Medication 975 MILLIGRAM(S): at 00:24

## 2019-06-03 NOTE — PHYSICAL THERAPY INITIAL EVALUATION ADULT - ADDITIONAL COMMENTS
pt lives in a house with her  without steps to enter and 1 flight to second floor. prior to admission she was amb Independently without AD

## 2019-06-03 NOTE — PROGRESS NOTE ADULT - SUBJECTIVE AND OBJECTIVE BOX
OB Progress Note: VAVD PPD#3    S: 33y PPD#3 s/p VAVD c/b IPT, s/p Amp/Gent and difficulty ambulating after delivery with left leg numbness, not yet resolved. MRI read pending. Neuro following. Pain is well controlled. She is tolerating a regular diet and passing flatus. She is voiding spontaneously but is unable to walk to the bathroom without assistance. She is breastfeeding. Denies CP/SOB. Denies HA/lightheadedness/dizziness. Denies N/V. Denies calf pain.    O:  Vitals:  Vital Signs Last 24 Hrs  T(C): 36.7 (03 Jun 2019 05:05), Max: 36.7 (03 Jun 2019 05:05)  T(F): 98 (03 Jun 2019 05:05), Max: 98 (03 Jun 2019 05:05)  HR: 83 (03 Jun 2019 05:05) (83 - 89)  BP: 128/78 (03 Jun 2019 05:05) (120/71 - 128/78)  BP(mean): --  RR: 17 (03 Jun 2019 05:05) (17 - 18)  SpO2: 98% (03 Jun 2019 05:05) (98% - 99%)    MEDICATIONS  (STANDING):  acetaminophen   Tablet .. 975 milliGRAM(s) Oral every 6 hours  diphtheria/tetanus/pertussis (acellular) Vaccine (ADAcel) 0.5 milliLiter(s) IntraMuscular once  ibuprofen  Tablet. 600 milliGRAM(s) Oral every 6 hours  measles/mumps/rubella Vaccine 0.5 milliLiter(s) SubCutaneous once  ondansetron    Tablet 8 milliGRAM(s) Oral once  oxytocin Infusion 333.333 milliUNIT(s)/Min (1000 mL/Hr) IV Continuous <Continuous>  oxytocin Infusion 333.333 milliUNIT(s)/Min (1000 mL/Hr) IV Continuous <Continuous>  oxytocin Infusion 4 milliUNIT(s)/Min (4 mL/Hr) IV Continuous <Continuous>  prenatal multivitamin 1 Tablet(s) Oral daily  sodium chloride 0.9% lock flush 3 milliLiter(s) IV Push every 8 hours      Labs:  Blood type: A Negative  Rubella IgG: RPR: Negative                          9.6<L>   16.64<H> >-----------< 218    ( 06-01 @ 10:19 )             29.9<L>                  Physical Exam:  General: NAD  Neuro: bilateral LE strength intact distally. weakness proximally at the knee bilaterally, sensation decreased on left leg  Abdomen: soft, non-tender, non-distended, fundus firm  Vaginal: lochia wnl, exam deferred  Extremities: no erythema/calf tenderness

## 2019-06-03 NOTE — PROGRESS NOTE ADULT - PROBLEM SELECTOR PLAN 1
- MRI read pending, will follow up  - Continue to appreciate Neuro recs  - PT requested  - Pain well controlled, continue current pain regimen  - Increase ambulation, SCDs when not ambulating  - Continue regular diet  - Discharge Planning    Racquel Fink, PGY1  Pager #[phone]

## 2019-06-03 NOTE — PHYSICAL THERAPY INITIAL EVALUATION ADULT - PERTINENT HX OF CURRENT PROBLEM, REHAB EVAL
33y s/p VAVD c/b IPT, s/p Amp/Gent and difficulty ambulating after delivery with left leg numbness. MRI lumbar spine shows Normal lumbosacral spine MRI. she is being followed by neurology who believe weakness and numbness are indicative of femoral neuropathy

## 2019-06-03 NOTE — PROGRESS NOTE ADULT - SUBJECTIVE AND OBJECTIVE BOX
Patient is a 33 year old woman who was admitted 5/30/19 with at term labor and underwent vacuum delivery on 5/31/19. Following delivery noted to be unable to stand. Also noted numbness left anterior calf. She states has noted persistent weakness and numbness but has been able to walk with difficulty with a walker. She notes pain involving the left and right lower abdomen . She notes pain involving the mid lower back without radiation. She denies other neurological complaints.    Exam: Awake, alert, appropriate           CN II-XII intact           Motor tone and strength-left and right iliopsoas-3-/5                                                 left and right quad 3-/5                                                 Otherwise 5/5           Reflexes 2+                < from: MR Lumbar Spine No Cont (06.02.19 @ 20:14) >    FINDINGS: The normal lumbar lordosis is maintained.    The disc spaces are well-maintained. There is no evidence for disc bulge   or disc herniation at any level.    The central canal and neural foraminal compromise is present. The marrow   signal is normal. The conus is within normal limits.    The uterus is prominent.    Impression:    Normal lumbosacral spine

## 2019-06-03 NOTE — PROGRESS NOTE ADULT - ASSESSMENT
Patient is a 33 year old woman who was admitted 5/30/19 with at term labor and underwent vacuum delivery on 5/31/19. Following delivery noted to be unable to stand. Also noted numbness left anterior calf. She states has noted persistent weakness and numbness but has been able to walk with difficulty with a walker. She notes pain involving the left and right lower abdomen . She notes pain involving the mid lower back without radiation. She denies other neurological complaints.    1) Would obtain CT Abdomen Pelvis to evaluate bilateral femoral neuropathy   2) Discussed with Dr. Shilpa Willis

## 2019-06-03 NOTE — PHYSICAL THERAPY INITIAL EVALUATION ADULT - PLANNED THERAPY INTERVENTIONS, PT EVAL
GOAL: pt will be able to negotiate 10 steps with hand rail and supervision in 4 weeks/bed mobility training/gait training/transfer training

## 2019-06-03 NOTE — PROGRESS NOTE ADULT - ASSESSMENT
A/P: 33y PPD#3 s/p VAVD  c/b IPT, s/p Amp/Gent and difficulty ambulating after delivery with left leg numbness, not yet resolved.

## 2019-06-03 NOTE — PROGRESS NOTE ADULT - SUBJECTIVE AND OBJECTIVE BOX
OB Attending Note    S: Patient doing well. Minimal lochia. Pain controlled. left leg weakness - can't stand without assistance or walk     O: Vital Signs Last 24 Hrs  T(C): 36.7 (03 Jun 2019 05:05), Max: 36.7 (03 Jun 2019 05:05)  T(F): 98 (03 Jun 2019 05:05), Max: 98 (03 Jun 2019 05:05)  HR: 83 (03 Jun 2019 05:05) (83 - 89)  BP: 128/78 (03 Jun 2019 05:05) (120/71 - 128/78)    Gen: NAD  Abd: soft, NT, ND, fundus firm below umbilicus  Lochia: min  Perineum healing well  Ext: no tenderness + 5/5 muscle strength, 2-3/5 on left leg straight raise or leg bend  upper no issues  sensatation decreased on left to cold and sharp    Labs:      A: 33y PPD#3  s/p VAVD with persistent left leg weakness    Plan: cont PP care  appreciate neuro consult - awaiting today recommendations (MRI wnl)  sensational and muscle weakness likely attributed to nerve strain  cont PT   pt has not been able to walk without assistance and will give another day in house to work with PT to naviagate the walker and get additional input from neuro

## 2019-06-04 RX ADMIN — HEPARIN SODIUM 5000 UNIT(S): 5000 INJECTION INTRAVENOUS; SUBCUTANEOUS at 11:27

## 2019-06-04 RX ADMIN — Medication 975 MILLIGRAM(S): at 21:00

## 2019-06-04 RX ADMIN — Medication 600 MILLIGRAM(S): at 20:13

## 2019-06-04 RX ADMIN — Medication 100 MILLIGRAM(S): at 20:13

## 2019-06-04 RX ADMIN — Medication 600 MILLIGRAM(S): at 21:00

## 2019-06-04 RX ADMIN — Medication 600 MILLIGRAM(S): at 11:25

## 2019-06-04 RX ADMIN — Medication 975 MILLIGRAM(S): at 13:00

## 2019-06-04 RX ADMIN — Medication 100 MILLIGRAM(S): at 11:26

## 2019-06-04 RX ADMIN — Medication 975 MILLIGRAM(S): at 20:13

## 2019-06-04 RX ADMIN — HEPARIN SODIUM 5000 UNIT(S): 5000 INJECTION INTRAVENOUS; SUBCUTANEOUS at 23:57

## 2019-06-04 RX ADMIN — Medication 1 TABLET(S): at 11:25

## 2019-06-04 RX ADMIN — Medication 975 MILLIGRAM(S): at 11:25

## 2019-06-04 NOTE — PROGRESS NOTE ADULT - SUBJECTIVE AND OBJECTIVE BOX
OB Progress Note: VAVD PPD#4    S: 34yo PPD#4 s/p VAVD c/b IPT, s/p Amp/Gent persistent left leg numbness concerning for femoral neuropathy. Neuro following. Lumbar MR wnl. Patient seen and evaluated by PT yesterday, they recommend rolling walker and home PT. The patient has been ambulating successfully with rolling walker since. Patient feels strength returning and is declining the CT recommended by Neuro yesterday evening. Pain is well controlled. She is tolerating a regular diet and passing flatus. She is voiding spontaneously, and ambulating without difficulty. Denies CP/SOB. Denies HA/lightheadedness/dizziness. Denies N/V. Denies calf pain.    O:  Vitals:  Vital Signs Last 24 Hrs  T(C): 36.6 (04 Jun 2019 06:50), Max: 36.6 (03 Jun 2019 21:01)  T(F): 97.8 (04 Jun 2019 06:50), Max: 97.9 (03 Jun 2019 21:01)  HR: 79 (04 Jun 2019 06:50) (79 - 82)  BP: 112/79 (04 Jun 2019 06:50) (112/79 - 119/71)  BP(mean): --  RR: 18 (04 Jun 2019 06:50) (17 - 18)  SpO2: 98% (04 Jun 2019 06:50) (98% - 99%)    MEDICATIONS  (STANDING):  acetaminophen   Tablet .. 975 milliGRAM(s) Oral every 6 hours  diphtheria/tetanus/pertussis (acellular) Vaccine (ADAcel) 0.5 milliLiter(s) IntraMuscular once  heparin  Injectable 5000 Unit(s) SubCutaneous every 12 hours  ibuprofen  Tablet. 600 milliGRAM(s) Oral every 6 hours  measles/mumps/rubella Vaccine 0.5 milliLiter(s) SubCutaneous once  ondansetron    Tablet 8 milliGRAM(s) Oral once  oxytocin Infusion 333.333 milliUNIT(s)/Min (1000 mL/Hr) IV Continuous <Continuous>  oxytocin Infusion 333.333 milliUNIT(s)/Min (1000 mL/Hr) IV Continuous <Continuous>  oxytocin Infusion 4 milliUNIT(s)/Min (4 mL/Hr) IV Continuous <Continuous>  prenatal multivitamin 1 Tablet(s) Oral daily  sodium chloride 0.9% lock flush 3 milliLiter(s) IV Push every 8 hours      Labs:  Blood type: A Negative  Rubella IgG: RPR: Negative                          9.6<L>   16.64<H> >-----------< 218    ( 06-01 @ 10:19 )             29.9<L>                  Physical Exam:  General: NAD  Neuro: motor function intact bilaterally in distal and proximal lower extremities (improved from yesterday) with persistent decreased sensation to light touch over left shin  Abdomen: soft, non-tender, non-distended, fundus firm  Vaginal: lochia wnl, exam deferred  Extremities: no erythema/calf tenderness

## 2019-06-04 NOTE — PROGRESS NOTE ADULT - ASSESSMENT
A/P: 32yo PPD#4 s/p VAVD c/b IPT, s/p Amp/Gent persistent left leg numbness concerning for femoral neuropathy. Patient is doing well postpartum.

## 2019-06-04 NOTE — PROGRESS NOTE ADULT - PROBLEM SELECTOR PLAN 1
Social work to see patient today to establish home care and home PT  Continue in house PT  Pt to f/u with neurology in 1 week as outpatient(Pt will schedule CT as outpatient if she feels she is not improving(refused as an inpatient-neuro is aware). EMG testing as outpatient if needed.  Office [phone]  Dr. Tovar

## 2019-06-04 NOTE — PROGRESS NOTE ADULT - PROBLEM SELECTOR PLAN 1
- Appreciate Neuro recs however pt refusing CT given improvement in symptoms  - Appreciate PT recs, continue rolling walker, set up at home PT  - Pain well controlled, continue current pain regimen  - Increase ambulation, SCDs when not ambulating  - Continue regular diet  - Standing colace  - Discharge planning    Racquel Fink, PGY1  Pager #[phone]

## 2019-06-04 NOTE — PROGRESS NOTE ADULT - SUBJECTIVE AND OBJECTIVE BOX
PPD#4- ATTENDING NOTE    S: Patient feels her LE weakness is improving but she still can't walk without a walker or pull herself up if she falls.  Patient is refusing to have a CT scan without contrast as recommended by neurology.    O: Vital Signs Last 24 Hrs  T(C): 36.6 (2019 06:50), Max: 36.6 (2019 21:01)  T(F): 97.8 (2019 06:50), Max: 97.9 (2019 21:01)  HR: 79 (2019 06:50) (79 - 82)  BP: 112/79 (2019 06:50) (112/79 - 119/71)  BP(mean): --  RR: 18 (2019 06:50) (17 - 18)  SpO2: 98% (2019 06:50) (98% - 99%)    Gen: NAD  Abd: soft, NT, ND, fundus firm below umbilicus  Lochia: moderate  Ext: Left LE-unable to extend or flex leg, foot weker on flexion than extension  right LE-stronger then left, able to extend and flex her foot, some weakness with lifting her leg       Labs:      A: 33y PPD#4 s/p  doing well.    Plan:  Social work to see patient today to establish home care and home PT  Continue in house PT  Pt to f/u with neurology in 1 week as outpatient(Pt will schedule CT as outpatient if she feels she is not improving(refused as an inpatient-neuro is aware). EMG testing as outpatient if needed.  Office [phone]  Dr. Tovar

## 2019-06-05 VITALS
TEMPERATURE: 98 F | DIASTOLIC BLOOD PRESSURE: 74 MMHG | RESPIRATION RATE: 18 BRPM | SYSTOLIC BLOOD PRESSURE: 112 MMHG | HEART RATE: 86 BPM

## 2019-06-05 PROCEDURE — 85027 COMPLETE CBC AUTOMATED: CPT

## 2019-06-05 PROCEDURE — 86780 TREPONEMA PALLIDUM: CPT

## 2019-06-05 PROCEDURE — 97161 PT EVAL LOW COMPLEX 20 MIN: CPT

## 2019-06-05 PROCEDURE — 59050 FETAL MONITOR W/REPORT: CPT

## 2019-06-05 PROCEDURE — 86900 BLOOD TYPING SEROLOGIC ABO: CPT

## 2019-06-05 PROCEDURE — 90707 MMR VACCINE SC: CPT

## 2019-06-05 PROCEDURE — 85014 HEMATOCRIT: CPT

## 2019-06-05 PROCEDURE — 86901 BLOOD TYPING SEROLOGIC RH(D): CPT

## 2019-06-05 PROCEDURE — 97116 GAIT TRAINING THERAPY: CPT

## 2019-06-05 PROCEDURE — 59025 FETAL NON-STRESS TEST: CPT

## 2019-06-05 PROCEDURE — 72148 MRI LUMBAR SPINE W/O DYE: CPT

## 2019-06-05 PROCEDURE — 86850 RBC ANTIBODY SCREEN: CPT

## 2019-06-05 PROCEDURE — G0463: CPT

## 2019-06-05 PROCEDURE — 85018 HEMOGLOBIN: CPT

## 2019-06-05 PROCEDURE — 97530 THERAPEUTIC ACTIVITIES: CPT

## 2019-06-05 PROCEDURE — 85460 HEMOGLOBIN FETAL: CPT

## 2019-06-05 RX ADMIN — Medication 975 MILLIGRAM(S): at 09:00

## 2019-06-05 RX ADMIN — Medication 600 MILLIGRAM(S): at 14:08

## 2019-06-05 RX ADMIN — Medication 600 MILLIGRAM(S): at 08:24

## 2019-06-05 RX ADMIN — Medication 0.5 MILLILITER(S): at 13:56

## 2019-06-05 RX ADMIN — Medication 600 MILLIGRAM(S): at 09:00

## 2019-06-05 RX ADMIN — Medication 975 MILLIGRAM(S): at 14:08

## 2019-06-05 RX ADMIN — Medication 1 TABLET(S): at 12:13

## 2019-06-05 RX ADMIN — Medication 975 MILLIGRAM(S): at 08:24

## 2019-06-05 RX ADMIN — Medication 100 MILLIGRAM(S): at 08:24

## 2019-06-05 NOTE — PROGRESS NOTE ADULT - SUBJECTIVE AND OBJECTIVE BOX
OB Progress Note: VAVD PPD#4    S: 33y PPD#4 s/p VAVD c/b IPT, s/p Amp/Gent persistent left leg numbness concerning for femoral neuropathy. Neuro following. Lumbar MR wnl. Patient seen and evaluated by PT, they recommend rolling walker and home PT. The patient has been ambulating successfully with rolling walker since. Patient feels strength returning and is declining the CT recommended by Neuro two days ago. Patient feels well. Pain is well controlled. She is tolerating a regular diet and passing flatus. She is voiding spontaneously, and ambulating without difficulty. She is breastfeeding. Denies CP/SOB. Denies HA/lightheadedness/dizziness. Denies N/V. Denies calf pain.    O:  Vitals:  Vital Signs Last 24 Hrs  T(C): 36.6 (05 Jun 2019 05:50), Max: 37.1 (04 Jun 2019 12:30)  T(F): 97.9 (05 Jun 2019 05:50), Max: 98.8 (04 Jun 2019 12:30)  HR: 86 (05 Jun 2019 05:50) (79 - 103)  BP: 112/74 (05 Jun 2019 05:50) (110/71 - 113/73)  BP(mean): --  RR: 18 (05 Jun 2019 05:50) (18 - 18)  SpO2: 98% (05 Jun 2019 00:30) (98% - 98%)    MEDICATIONS  (STANDING):  acetaminophen   Tablet .. 975 milliGRAM(s) Oral every 6 hours  diphtheria/tetanus/pertussis (acellular) Vaccine (ADAcel) 0.5 milliLiter(s) IntraMuscular once  heparin  Injectable 5000 Unit(s) SubCutaneous every 12 hours  ibuprofen  Tablet. 600 milliGRAM(s) Oral every 6 hours  measles/mumps/rubella Vaccine 0.5 milliLiter(s) SubCutaneous once  ondansetron    Tablet 8 milliGRAM(s) Oral once  oxytocin Infusion 333.333 milliUNIT(s)/Min (1000 mL/Hr) IV Continuous <Continuous>  oxytocin Infusion 333.333 milliUNIT(s)/Min (1000 mL/Hr) IV Continuous <Continuous>  oxytocin Infusion 4 milliUNIT(s)/Min (4 mL/Hr) IV Continuous <Continuous>  prenatal multivitamin 1 Tablet(s) Oral daily  sodium chloride 0.9% lock flush 3 milliLiter(s) IV Push every 8 hours      Labs:  Blood type: A Negative  Rubella IgG: RPR: Negative                    Physical Exam:  General: NAD  Abdomen: soft, non-tender, non-distended, fundus firm  Vaginal: lochia wnl, exam deferred  Extremities: no erythema/calf tenderness

## 2019-06-05 NOTE — PROGRESS NOTE ADULT - ATTENDING COMMENTS
Pt is s/p , complicated by fem nerve neuropathy, doing well, was able to walk up and down stairs yesterday with PT. Plan for home PT with walker.   -Discharge home pending all organized at home with case mgmt  -Precautions given   -Instructions for follow up with neuro reviewed.   -Follow-up in office in 6 weeks      Mindi Hathaway DO
OB attending    Patient seen and examined,  continues to complain of leg pain.  Neurology consultation appreciated, believes may be residual effects from anesthesia. Will continue to monitor.  -Routine postpartum care   -CBC appropriate     Melanie Acosta MD
OB attending    Patient seen and examined, agree with assessment and plan above.   PPD2 s/p VAVD after IOL, now with gait disturbance s/p epidural anesthesia.    -continue assistance to commode  -PT consult  -Imaging recommendation per Neurology team   -Patient not stable for discharge today, will consider tomorrow.

## 2019-06-05 NOTE — PROGRESS NOTE ADULT - PROBLEM SELECTOR PROBLEM 1
Vacuum extractor delivery, delivered

## 2019-06-05 NOTE — PROGRESS NOTE ADULT - PROBLEM SELECTOR PLAN 1
- Appreciate Neuro recs  - Appreciate social work assistance  - Appreciate PT recs  - Pain well controlled, continue current pain regimen  - Increase ambulation, SCDs when not ambulating  - Continue regular diet  - Discharge Planning    Racquel Fink, PGY1  Pager #[phone]

## 2019-06-05 NOTE — PROGRESS NOTE ADULT - ASSESSMENT
A/P: 33y PPD#4 s/p VAVD c/b IPT, s/p Amp/Gent persistent left leg numbness concerning for femoral neuropathy, Neuro following, imaging wnl, patient s/p PT consult. Discharge pending home PT set up and rolling walker access. Patient doing well postpartum.

## 2019-07-09 ENCOUNTER — RESULT REVIEW (OUTPATIENT)
Age: 34
End: 2019-07-09

## 2019-07-15 ENCOUNTER — RESULT REVIEW (OUTPATIENT)
Age: 34
End: 2019-07-15

## 2019-08-26 ENCOUNTER — APPOINTMENT (OUTPATIENT)
Dept: NEUROLOGY | Facility: CLINIC | Age: 34
End: 2019-08-26

## 2020-10-11 NOTE — PROGRESS NOTE ADULT - ASSESSMENT
MD Knox notified regarding patients base line QTC being above 500. MD stated he saw EKG earlier and it is okay to start sotalol loading.    A/P: 34yo PPD#1 s/p VAVD c/b intrapartum temperatue.  Patient is stable and doing well post-partum. A/P: 34yo PPD#1 s/p VAVD c/b intrapartum temperatue.  Patient is stable however with difficulty ambulating postpartum.

## 2021-01-13 ENCOUNTER — RESULT REVIEW (OUTPATIENT)
Age: 36
End: 2021-01-13

## 2021-03-30 ENCOUNTER — APPOINTMENT (OUTPATIENT)
Dept: ANTEPARTUM | Facility: CLINIC | Age: 36
End: 2021-03-30

## 2021-03-30 ENCOUNTER — ASOB RESULT (OUTPATIENT)
Age: 36
End: 2021-03-30

## 2021-05-04 ENCOUNTER — EMERGENCY (EMERGENCY)
Facility: HOSPITAL | Age: 36
LOS: 1 days | Discharge: HOME | End: 2021-05-04
Attending: STUDENT IN AN ORGANIZED HEALTH CARE EDUCATION/TRAINING PROGRAM | Admitting: STUDENT IN AN ORGANIZED HEALTH CARE EDUCATION/TRAINING PROGRAM
Payer: COMMERCIAL

## 2021-05-04 VITALS
HEIGHT: 64 IN | DIASTOLIC BLOOD PRESSURE: 58 MMHG | TEMPERATURE: 98 F | RESPIRATION RATE: 18 BRPM | SYSTOLIC BLOOD PRESSURE: 104 MMHG | HEART RATE: 75 BPM | OXYGEN SATURATION: 99 %

## 2021-05-04 LAB
ALBUMIN SERPL ELPH-MCNC: 3.5 G/DL — SIGNIFICANT CHANGE UP (ref 3.3–5)
ALP SERPL-CCNC: 58 U/L — SIGNIFICANT CHANGE UP (ref 40–120)
ALT FLD-CCNC: 19 U/L — SIGNIFICANT CHANGE UP (ref 4–33)
ANION GAP SERPL CALC-SCNC: 10 MMOL/L — SIGNIFICANT CHANGE UP (ref 7–14)
AST SERPL-CCNC: 11 U/L — SIGNIFICANT CHANGE UP (ref 4–32)
BASOPHILS # BLD AUTO: 0.04 K/UL — SIGNIFICANT CHANGE UP (ref 0–0.2)
BASOPHILS NFR BLD AUTO: 0.3 % — SIGNIFICANT CHANGE UP (ref 0–2)
BILIRUB SERPL-MCNC: <0.2 MG/DL — SIGNIFICANT CHANGE UP (ref 0.2–1.2)
BUN SERPL-MCNC: 10 MG/DL — SIGNIFICANT CHANGE UP (ref 7–23)
CALCIUM SERPL-MCNC: 9 MG/DL — SIGNIFICANT CHANGE UP (ref 8.4–10.5)
CHLORIDE SERPL-SCNC: 103 MMOL/L — SIGNIFICANT CHANGE UP (ref 98–107)
CO2 SERPL-SCNC: 22 MMOL/L — SIGNIFICANT CHANGE UP (ref 22–31)
CREAT SERPL-MCNC: 0.56 MG/DL — SIGNIFICANT CHANGE UP (ref 0.5–1.3)
EOSINOPHIL # BLD AUTO: 0.05 K/UL — SIGNIFICANT CHANGE UP (ref 0–0.5)
EOSINOPHIL NFR BLD AUTO: 0.4 % — SIGNIFICANT CHANGE UP (ref 0–6)
GLUCOSE SERPL-MCNC: 88 MG/DL — SIGNIFICANT CHANGE UP (ref 70–99)
HCT VFR BLD CALC: 39.9 % — SIGNIFICANT CHANGE UP (ref 34.5–45)
HGB BLD-MCNC: 13.1 G/DL — SIGNIFICANT CHANGE UP (ref 11.5–15.5)
IANC: 10.48 K/UL — HIGH (ref 1.5–8.5)
IMM GRANULOCYTES NFR BLD AUTO: 0.5 % — SIGNIFICANT CHANGE UP (ref 0–1.5)
LYMPHOCYTES # BLD AUTO: 1.54 K/UL — SIGNIFICANT CHANGE UP (ref 1–3.3)
LYMPHOCYTES # BLD AUTO: 11.9 % — LOW (ref 13–44)
MCHC RBC-ENTMCNC: 29.3 PG — SIGNIFICANT CHANGE UP (ref 27–34)
MCHC RBC-ENTMCNC: 32.8 GM/DL — SIGNIFICANT CHANGE UP (ref 32–36)
MCV RBC AUTO: 89.3 FL — SIGNIFICANT CHANGE UP (ref 80–100)
MONOCYTES # BLD AUTO: 0.72 K/UL — SIGNIFICANT CHANGE UP (ref 0–0.9)
MONOCYTES NFR BLD AUTO: 5.6 % — SIGNIFICANT CHANGE UP (ref 2–14)
NEUTROPHILS # BLD AUTO: 10.48 K/UL — HIGH (ref 1.8–7.4)
NEUTROPHILS NFR BLD AUTO: 81.3 % — HIGH (ref 43–77)
NRBC # BLD: 0 /100 WBCS — SIGNIFICANT CHANGE UP
NRBC # FLD: 0 K/UL — SIGNIFICANT CHANGE UP
PLATELET # BLD AUTO: 312 K/UL — SIGNIFICANT CHANGE UP (ref 150–400)
POTASSIUM SERPL-MCNC: 3.9 MMOL/L — SIGNIFICANT CHANGE UP (ref 3.5–5.3)
POTASSIUM SERPL-SCNC: 3.9 MMOL/L — SIGNIFICANT CHANGE UP (ref 3.5–5.3)
PROT SERPL-MCNC: 6.8 G/DL — SIGNIFICANT CHANGE UP (ref 6–8.3)
RBC # BLD: 4.47 M/UL — SIGNIFICANT CHANGE UP (ref 3.8–5.2)
RBC # FLD: 12.4 % — SIGNIFICANT CHANGE UP (ref 10.3–14.5)
SODIUM SERPL-SCNC: 135 MMOL/L — SIGNIFICANT CHANGE UP (ref 135–145)
WBC # BLD: 12.89 K/UL — HIGH (ref 3.8–10.5)
WBC # FLD AUTO: 12.89 K/UL — HIGH (ref 3.8–10.5)

## 2021-05-04 PROCEDURE — 76815 OB US LIMITED FETUS(S): CPT | Mod: 26

## 2021-05-04 PROCEDURE — 99285 EMERGENCY DEPT VISIT HI MDM: CPT | Mod: 25

## 2021-05-04 RX ORDER — SODIUM CHLORIDE 9 MG/ML
1000 INJECTION, SOLUTION INTRAVENOUS ONCE
Refills: 0 | Status: COMPLETED | OUTPATIENT
Start: 2021-05-04 | End: 2021-05-04

## 2021-05-04 RX ADMIN — SODIUM CHLORIDE 1000 MILLILITER(S): 9 INJECTION, SOLUTION INTRAVENOUS at 09:22

## 2021-05-04 NOTE — ED PROVIDER NOTE - OBJECTIVE STATEMENT
35 year old female  approximately 15 weeks pregnant (LMP "beginning of January") presents with syncopal episode today. Pt reports standing at work, feeling nauseous, lightheadedness, reports "dark vision" and lowered herself to the ground. Pt reports symptoms for approximately 1 minute, denies any head injury or fall. Denies any fevers, numbness, weakness, chest pain, shortness of breath, abdominal pain, pelvic pain, vaginal bleeding, or rash. Denies any recent illness. Pt states pregnancy has been uncomplicated to date, reports US confirming IUP. Pt reports mild dizziness upon standing, otherwise states her symptoms have resolved. Denies any history of syncope in the past. Denies any additional complaints.

## 2021-05-04 NOTE — ED PROVIDER NOTE - CLINICAL SUMMARY MEDICAL DECISION MAKING FREE TEXT BOX
Loc-PGY2: 35 year old female  approximately 15 weeks pregnant (LMP "beginning of January") presents with syncopal episode today. Pt reports standing at work, feeling nauseous, lightheadedness, reports "dark vision" and lowered herself to the ground. Pt reports symptoms for approximately 1 minute, denies any head injury or fall. Likely vasovagal as pt reports prodrome prior to symptoms. Pt with confirmed IUP, no lower abdominal pain or vaginal bleeding to suggest ectopic pregnancy. No evidence of a cardiac arrythmia such as Brugada, WPW, HOCM, long or short QT.  Neurologic exam is nonfocal, not c/w CVA or primary neurologic abnormality. Will obtain labs r/o symptomatic anemia, bedside US eval FH, anticipate likely DC with OB/GYN follow up and return precautions.

## 2021-05-04 NOTE — ED PROVIDER NOTE - NS ED ROS FT
Review of Systems    Constitutional: (-) fever, (-) chills, (-) fatigue  HEENT: (-) sore throat, (-) hearing loss, (-) nasal congestion  Cardiovascular: (-) chest pain, (+) syncope  Respiratory: (-) cough, (-) shortness of breath  Gastrointestinal: (-) vomiting, (-) diarrhea, (-) abdominal pain  Musculoskeletal: (-) neck pain, (-) back pain, (-) joint pain  Integumentary: (-) rash, (-) edema, (-) wound  Neurological: (-) headache, (-) altered mental status    Except as documented in the HPI, all other systems are negative.

## 2021-05-04 NOTE — ED PROVIDER NOTE - PHYSICAL EXAMINATION
CONSTITUTIONAL: non-toxic, well appearing  SKIN: no rash, no petechiae.  EYES: PERRL, EOMI, pink conjunctiva, anicteric  ENT: tongue and uvular midline, no exudates, moist mucous membranes  NECK: Supple; no meningismus, no JVD  CARD: RRR, no murmurs, equal radial pulses bilaterally 2+  RESP: CTAB, no respiratory distress  ABD: Soft, non-tender, non-distended, no peritoneal signs  EXT: Normal ROM x4. No edema. No calf tenderness.  NEURO: Alert, oriented. Neuro exam nonfocal, equal strength bilaterally. CN II-XII intact. 5/5 strength bilateral upper and lower extremities. Sensation grossly intact. Steady gait. Normal Romberg test.   PSYCH: Cooperative, appropriate.

## 2021-05-04 NOTE — ED ADULT TRIAGE NOTE - CHIEF COMPLAINT QUOTE
Pt is 15 weeks pregnant complaining of a near syncopal episode. Pt states she became dizzy. Pt denies chest pain, sob, n/v/d fever or chills.

## 2021-05-04 NOTE — ED PROVIDER NOTE - ATTENDING CONTRIBUTION TO CARE
34 yo F  approx 15 weeks presents after near syncope today at work. was standing with coat on in a warm office when she felt hot, vision "went black" she felt lightheaded and lied down. she denies ever have LOC. now has mild HA and midl lightheadedness but feels better. denies cp, sob, palpitations, abd pain, n/v, dysuria, hematuria, vag bleeding, numbness or weakness, back pain. no prior syncope. does report eating breakfast. does report trying to stay hydrated but not drinking excessively 2/2 lack of bathroom access at work. exam as above. ekg as above. suspect vasovagal episode. plan: labs, pocus, IVF, symptom relief prn reassess. pt has out-patient OB

## 2021-05-04 NOTE — ED PROVIDER NOTE - PROGRESS NOTE DETAILS
Loc-PGY2: pt seen and re-evaluated at bedside.  Pt states her symptoms have improved.  Pt comfortable in NAD.   on POCUS with MD Calderon.

## 2021-05-04 NOTE — ED ADULT NURSE NOTE - OBJECTIVE STATEMENT
Pt. is A&Ox4 15 weeks pregnant, , ambulatory presents to ER c/o near syncope episode at work. Pt. states she felt nauseous and lightheaded, sat down, experienced dark vision for 2 minutes, and denies LOC or fall. Pt. currently c/o dizziness and mild headache. Pt. denies CP, SOB, fevers, vomiting, and diarrhea. Respirations even and unlabored, abdomen nontender and nondistended. 20G IV obtained in LAC, blood obtained, flushing without resistance, dressing dry and intact. Medicated as ordered. Safety precautions obtained, call bell given to pt.

## 2021-05-04 NOTE — ED PROVIDER NOTE - PATIENT PORTAL LINK FT
Clear bilaterally, pupils equal, round and reactive to light. You can access the FollowMyHealth Patient Portal offered by [facility] by registering at the following website: [email or web address]. By joining [employer]’s FollowMyHealth portal, you will also be able to view your health information using other applications (apps) compatible with our system.

## 2021-05-04 NOTE — ED PROVIDER NOTE - NSFOLLOWUPINSTRUCTIONS_ED_ALL_ED_FT
Dizziness    Dizziness can manifest as a feeling of unsteadiness or light-headedness. You may feel like you are about to faint. This condition can be caused by a number of things, including medicines, dehydration, or illness. Drink enough fluid to keep your urine clear or pale yellow. Do not drink alcohol and limit your caffeine intake. Avoid quick or sudden movements.  Rise slowly from chairs and steady yourself until you feel okay. In the morning, first sit up on the side of the bed.    Rest and stay well hydrated.    Follow up with your OB/GYN as discussed.    SEEK IMMEDIATE MEDICAL CARE IF YOU HAVE ANY OF THE FOLLOWING SYMPTOMS: vomiting, changes in your vision or speech, weakness in your arms or legs, trouble speaking or swallowing, chest pain, abdominal pain, shortness of breath, sweating, bleeding, headache, neck pain, fever, pelvic pain, or vaginal bleeding.

## 2023-11-05 NOTE — DISCHARGE NOTE OB - STOOL SOFTENER OR LAXATIVE AS DIRECTED BY YOUR HEALTHCARE PROVIDER UNTIL EPISIOTOMY OR TEAR REPAIR HEALS.  REFER TO MEDICATION DISCHARGE FORM
Dry Eyes OU. -Explained LIBERTAD 
Home
Statement Selected

## 2025-06-30 NOTE — HEALTH RISK ASSESSMENT
Problem: Genitourinary - Adult  Goal: Absence of urinary retention  Recent Flowsheet Documentation  Taken 6/29/2025 2000 by Rachel Perez RN  Absence of urinary retention: Assess patient’s ability to void and empty bladder     Problem: Safety - Medical Restraint  Goal: Remains free of injury from restraints (Restraint for Interference with Medical Device)  Description: INTERVENTIONS:  1. Determine that other, less restrictive measures have been tried or would not be effective before applying the restraint  2. Evaluate the patient's condition at the time of restraint application  3. Inform patient/family regarding the reason for restraint  4. Q2H: Monitor safety, psychosocial status, comfort, nutrition and hydration  Recent Flowsheet Documentation  Taken 6/29/2025 1827 by Ashley Martinez, RN  Remains free of injury from restraints (restraint for interference with medical device): Determine that other, less restrictive measures have been tried or would not be effective before applying the restraint      [Good] : ~his/her~  mood as  good [No falls in past year] : Patient reported no falls in the past year [1] : 1) Little interest or pleasure doing things for several days (1) [0] : 2) Feeling down, depressed, or hopeless: Not at all (0) [None] : None [With Significant Other] : lives with significant other [With Family] : lives with family [# of Members in Household ___] :  household currently consist of [unfilled] member(s) [Employed] : employed [College] : College [] :  [# Of Children ___] : has [unfilled] children [Feels Safe at Home] : Feels safe at home [Fully functional (bathing, dressing, toileting, transferring, walking, feeding)] : Fully functional (bathing, dressing, toileting, transferring, walking, feeding) [Fully functional (using the telephone, shopping, preparing meals, housekeeping, doing laundry, using] : Fully functional and needs no help or supervision to perform IADLs (using the telephone, shopping, preparing meals, housekeeping, doing laundry, using transportation, managing medications and managing finances) [Smoke Detector] : smoke detector [Carbon Monoxide Detector] : carbon monoxide detector [Safety elements used in home] : safety elements used in home [Seat Belt] :  uses seat belt [Sunscreen] : uses sunscreen [] : No [de-identified] : Cigar prn  former cigg smoker [[id number]] : 1 [Change in mental status noted] : No change in mental status noted [Language] : denies difficulty with language [Behavior] : denies difficulty with behavior [Learning/Retaining New Information] : denies difficulty learning/retaining new information [Handling Complex Tasks] : denies difficulty handling complex tasks [Reasoning] : denies difficulty with reasoning [Spatial Ability and Orientation] : denies difficulty with spatial ability and orientation [Reports changes in hearing] : Reports no changes in hearing [Reports changes in vision] : Reports no changes in vision [Reports changes in dental health] : Reports no changes in dental health [PapSmearDate] : 06/05/2018